# Patient Record
Sex: FEMALE | Employment: FULL TIME | URBAN - METROPOLITAN AREA
[De-identification: names, ages, dates, MRNs, and addresses within clinical notes are randomized per-mention and may not be internally consistent; named-entity substitution may affect disease eponyms.]

---

## 2020-06-18 ENCOUNTER — HOSPITAL ENCOUNTER (OUTPATIENT)
Age: 45
Discharge: HOME | End: 2020-06-18
Attending: EMERGENCY MEDICINE
Payer: COMMERCIAL

## 2020-06-18 VITALS
SYSTOLIC BLOOD PRESSURE: 134 MMHG | HEART RATE: 66 BPM | DIASTOLIC BLOOD PRESSURE: 74 MMHG | TEMPERATURE: 98 F | RESPIRATION RATE: 18 BRPM | OXYGEN SATURATION: 100 %

## 2020-06-18 DIAGNOSIS — Z20.822 COVID-19 VIRUS TEST RESULT UNKNOWN: Primary | ICD-10-CM

## 2020-06-18 PROCEDURE — 99203 OFFICE O/P NEW LOW 30 MIN: CPT

## 2020-06-18 PROCEDURE — 99202 OFFICE O/P NEW SF 15 MIN: CPT

## 2020-06-18 NOTE — ED PROVIDER NOTES
Patient Seen in: [facility] In [address] [place] [address]    History   Patient presents with:  Note For Work    Stated Complaint: COVID testing    HPI    Patient complains of being exposed to a COVID 19+ patient at work, patient wants to get tested p Disposition and Plan     Clinical Impression:  [id number] virus test result unknown  (primary encounter diagnosis)    Disposition:  Discharge  6/18/2020 10:33 am    Follow-up:  [facility] [place] [id number]  [phone]

## (undated) NOTE — LETTER
[address]  [phone]          Patient: Nelia Castleman   YOB: 1975   Date of Visit: 6/18/2020       Dear Employer,         June 18, 2020    At [facility] [address], w it is at all feasible for them to do so. COVID-19 is especially risky for high-risk patients (including, but not limited to, age over 61, immunosuppressed status due to disease or medication, chronic respiratory or heart conditions and diabetes).     · Mark Whiting